# Patient Record
Sex: FEMALE | Race: WHITE
[De-identification: names, ages, dates, MRNs, and addresses within clinical notes are randomized per-mention and may not be internally consistent; named-entity substitution may affect disease eponyms.]

---

## 2018-05-17 ENCOUNTER — HOSPITAL ENCOUNTER (OUTPATIENT)
Age: 23
End: 2018-05-17
Payer: COMMERCIAL

## 2018-05-17 LAB
ADD MANUAL DIFF / SLIDE REVIEW: NO
BUN SERPL-MCNC: 13 MG/DL (ref 7–17)
CALCIUM SERPL-MCNC: 9.2 MG/DL (ref 8.4–10.2)
CHLORIDE SERPL-SCNC: 108 MMOL/L (ref 98–107)
CO2 SERPL-SCNC: 24 MMOL/L (ref 22–32)
ESTIMATED GLOMERULAR FILT RATE: > 60 ML/MIN (ref 60–?)
GLUCOSE SERPL-MCNC: 87 MG/DL (ref 70–100)
HEMATOCRIT: 37 % (ref 36–46)
HEMOGLOBIN: 12.5 G/DL (ref 12–16)
HEMOLYSIS: < 15 (ref 0–50)
MCV RBC: 83.5 FL (ref 80–100)
MEAN CORPUSCULAR HEMOGLOBIN: 28.3 PG (ref 26–34)
MEAN CORPUSCULAR HGB CONC: 33.9 % (ref 30–36)
PLATELET COUNT: 336 X10^3/UL (ref 150–400)
POTASSIUM SERPL-SCNC: 4.5 MMOL/L (ref 3.4–5.1)
SODIUM SERPL-SCNC: 143 MMOL/L (ref 137–145)

## 2018-05-17 PROCEDURE — 36415 COLL VENOUS BLD VENIPUNCTURE: CPT

## 2018-05-17 PROCEDURE — 85025 COMPLETE CBC W/AUTO DIFF WBC: CPT

## 2018-05-17 PROCEDURE — 80048 BASIC METABOLIC PNL TOTAL CA: CPT

## 2018-09-21 ENCOUNTER — HOSPITAL ENCOUNTER (EMERGENCY)
Age: 23
Discharge: ANOTHER HOSPITAL | End: 2018-09-21
Payer: COMMERCIAL

## 2018-09-21 VITALS — SYSTOLIC BLOOD PRESSURE: 156 MMHG | HEART RATE: 79 BPM | DIASTOLIC BLOOD PRESSURE: 78 MMHG

## 2018-09-21 VITALS
OXYGEN SATURATION: 97 % | HEART RATE: 73 BPM | DIASTOLIC BLOOD PRESSURE: 74 MMHG | SYSTOLIC BLOOD PRESSURE: 126 MMHG | RESPIRATION RATE: 18 BRPM

## 2018-09-21 VITALS
TEMPERATURE: 98.2 F | HEART RATE: 73 BPM | RESPIRATION RATE: 20 BRPM | SYSTOLIC BLOOD PRESSURE: 133 MMHG | DIASTOLIC BLOOD PRESSURE: 85 MMHG | OXYGEN SATURATION: 99 %

## 2018-09-21 VITALS — BODY MASS INDEX: 43.2 KG/M2

## 2018-09-21 DIAGNOSIS — I61.9: Primary | ICD-10-CM

## 2018-09-21 LAB
ADD MANUAL DIFF / SLIDE REVIEW: NO
ALBUMIN SERPL-MCNC: 4.3 G/DL (ref 3.5–5)
ALBUMIN/GLOB SERPL: 1.4 {RATIO} (ref 1–2.8)
ALP SERPL-CCNC: 95 U/L (ref 38–126)
ALT SERPL-CCNC: 28 IU/L (ref 9–52)
BUN SERPL-MCNC: 15 MG/DL (ref 7–17)
CALCIUM SERPL-MCNC: 9.4 MG/DL (ref 8.4–10.2)
CHLORIDE SERPL-SCNC: 105 MMOL/L (ref 98–107)
CK SERPL-CCNC: 65 U/L (ref 30–135)
CO2 SERPL-SCNC: 26 MMOL/L (ref 22–32)
ESTIMATED GLOMERULAR FILT RATE: > 60 ML/MIN (ref 60–?)
GLOBULIN SER CALC-MCNC: 3 G/DL (ref 1.7–4.1)
GLUCOSE SERPL-MCNC: 91 MG/DL (ref 70–100)
HEMATOCRIT: 39.9 % (ref 36–46)
HEMOGLOBIN: 13.4 G/DL (ref 12–16)
HEMOLYSIS: < 15 (ref 0–50)
MCV RBC: 81 FL (ref 80–100)
MEAN CORPUSCULAR HEMOGLOBIN: 27.1 PG (ref 26–34)
MEAN CORPUSCULAR HGB CONC: 33.5 % (ref 30–36)
PLATELET COUNT: 295 X10^3/UL (ref 150–400)
POTASSIUM SERPL-SCNC: 4.1 MMOL/L (ref 3.4–5.1)
PROT SERPL-MCNC: 7.3 G/DL (ref 6.3–8.2)
SODIUM SERPL-SCNC: 142 MMOL/L (ref 137–145)
TROPONIN I SERPL-MCNC: < 0.012 NG/ML (ref 0.01–0.03)

## 2018-09-21 PROCEDURE — 80053 COMPREHEN METABOLIC PANEL: CPT

## 2018-09-21 PROCEDURE — 82550 ASSAY OF CK (CPK): CPT

## 2018-09-21 PROCEDURE — 93005 ELECTROCARDIOGRAM TRACING: CPT

## 2018-09-21 PROCEDURE — 70450 CT HEAD/BRAIN W/O DYE: CPT

## 2018-09-21 PROCEDURE — 81025 URINE PREGNANCY TEST: CPT

## 2018-09-21 PROCEDURE — 99283 EMERGENCY DEPT VISIT LOW MDM: CPT

## 2018-09-21 PROCEDURE — 96360 HYDRATION IV INFUSION INIT: CPT

## 2018-09-21 PROCEDURE — 71045 X-RAY EXAM CHEST 1 VIEW: CPT

## 2018-09-21 PROCEDURE — 93041 RHYTHM ECG TRACING: CPT

## 2018-09-21 PROCEDURE — 85025 COMPLETE CBC W/AUTO DIFF WBC: CPT

## 2018-09-21 PROCEDURE — 82553 CREATINE MB FRACTION: CPT

## 2018-09-21 PROCEDURE — 99285 EMERGENCY DEPT VISIT HI MDM: CPT

## 2018-09-21 PROCEDURE — 70496 CT ANGIOGRAPHY HEAD: CPT

## 2018-09-21 PROCEDURE — 81003 URINALYSIS AUTO W/O SCOPE: CPT

## 2018-09-21 PROCEDURE — 96361 HYDRATE IV INFUSION ADD-ON: CPT

## 2018-09-21 PROCEDURE — 84484 ASSAY OF TROPONIN QUANT: CPT

## 2018-09-21 PROCEDURE — 36415 COLL VENOUS BLD VENIPUNCTURE: CPT

## 2018-09-21 NOTE — DI.CT.S_ITS
PROCEDURE:  CT HEAD/BRAIN WO CON  
   
INDICATIONS:  reports headache with periods of increased blood pressure  
   
TECHNIQUE:    
Noncontrast 4.5 mm thick angled axial sections acquired from the foramen magnum to the   
vertex, with coronal and sagittal reformats.  For radiation dose reduction, the following   
was used:  automated exposure control, adjustment of mA and/or kV according to patient   
size.    
   
COMPARISON:  None.  
   
FINDINGS:    
Image quality:  Excellent.    
   
CSF spaces:  Basal cisterns are patent.  No extra-axial fluid collections.  Ventricles   
are normal in size and shape.    
   
Brain:  No midline shift. There is a 0.7 x 1.3 x 0.5 cm hyperdensity in the anterior left   
frontal subcortical white matter consistent with acute/subacute parenchymal hemorrhage.    
Gray-white matter interface is normal.    
   
Skull and face:  Calvarium and visualized facial bones are intact, without suspicious   
lesions.    
   
Sinuses:  Visualized sinuses and mastoids are clear.    
   
IMPRESSION:    
   
1.  Small left frontal acute/subacute parenchymal hematoma.  
   
2.  Findings telephoned to FABIENNE Villeda on 9/21/2018 at 1837 hrs.  
   
   
   
Dictated by: Elizabeth Herrmann MD, PhD on 9/21/2018 at 18:34       
Approved by: Elizabeth Herrmann MD, PhD on 9/21/2018 at 18:40

## 2018-09-21 NOTE — ED_ITS
"HPI - General Adult    
<FABIENNE Villeda - Last Filed: 09/21/18 21:41>    
General    
Chief complaint: Hypertension    
Stated complaint: HIGH BLOOD PRESSURE    
Time Seen by Provider: 09/21/18 17:32    
Source: patient    
Mode of arrival: ambulatory    
Limitations: no limitations    
History of Present Illness    
HPI narrative:   23-year-old healthy female that is a nonsmoker  here for   
complaint of having headache on off over the past several weeks.  She reports   
that she has had worsening headache over the past 3 days.  She also reports   
that her blood pressure has been elevated over the last several days as well.     
She denies any nausea vomiting.  She denies having history of headaches.   She   
does report that she had a history of preeclampsia when she was pregnant the 4   
months ago.  She was treated with labetalol.  she denies taking any blood   
pressure medicines at this time.   Positive p.o. intake.  She denies any chest   
pain no shortness of breath.  No abdominal pain.  She is ambulatory into the   
emergency room.  She denies any neurological changes    
Related Data    
 Home Medications    
    
    
    
 Medication  Instructions  Recorded  Confirmed    
     
sertraline [Zoloft] 100 mg PO QDAY 09/21/18 09/21/18    
    
    
 Previous Rx's    
    
    
    
 Medication  Instructions  Recorded    
     
lorazepam 1 mg tablet 1 mg PO Q8H PRN #30 tab 06/25/18    
    
    
    
 Allergies    
    
    
    
Allergy/AdvReac Type Severity Reaction Status Date / Time    
     
 inhaled anesthesia  AdvReac Intermediate Vomiting Uncoded 05/25/18 16:37    
    
    
    
    
Review of Systems    
<FABIENNE Villeda - Last Filed: 09/21/18 21:41>    
Constitutional    
Denies chills, Denies fever(s), Denies lethargy and Denies weakness    
Eyes    
Denies change in vision, Denies eye discharge, Denies irritation and Denies   
loss of vision    
ENT    
Ears, Nose, Mouth, and Throat: Denies change in voice, Denies neck pain and   
Denies sore throat    
Cardiovascular    
Denies chest pain, Denies irregular heart rhythm, Denies lightheadedness,   
Denies palpitations, Denies dyspnea, Denies dyspnea on exertion and Denies   
orthopnea    
Comments:  increased blood pressure    
Respiratory    
Denies cough, Denies dyspnea, Denies dyspnea on exertion and Denies wheezing    
Musculoskeletal    
Denies neck pain    
Neurologic    
Denies loss of vision and Denies weakness    
Endocrine    
Denies palpitations    
Allergic/Immunologic    
Denies wheezing    
    
Exam    
<FABIENNE Villeda - Last Filed: 09/21/18 21:41>    
Initial Vital Signs    
Initial Vital Signs:  Vital Signs    
    
    
    
Temperature  98.2 F   09/21/18 16:40    
     
Pulse Rate  73   09/21/18 16:40    
     
Respiratory Rate  20   09/21/18 16:40    
     
Blood Pressure  133/85   09/21/18 16:40    
     
Pulse Oximetry  99   09/21/18 16:40    
    
    
    
Const    
General: cooperative and well developed    
Nutritional Appearance: well nourished    
Orientation: alert, awake, oriented x3 and not confused    
[facility] [place]    
Mouth: oral mucosae normal and oropharynx normal    
Eyes    
Conjunctivae: conjunctivae normal    
Sclera: sclerae normal    
Pupils: PERRL    
EOM: EOM intact bilaterally    
Resp    
Effort & Inspection: normal respiratory effort, able to speak in complete   
sentences, no respiratory distress and no use of accessory muscles    
Auscultation: clear to auscultation bilaterally, no rales, no rhonchi and no   
wheezes    
Cardio    
Rate: regular rate    
Rhythm: regular rhythm    
Heart Sounds: no click, no gallops, no murmurs and no rubs    
Pulses: normal peripheral pulses    
GI    
Inspection: non-distended    
Palpation: soft, no hepatosplenomegaly, No guarding, No pulsatile mass and No   
tender    
Auscultation: normal bowel sounds    
    
General: No CVA tenderness    
Skin   
[id number]|2018-09-21 17:43:00|2018-09-21 18:34:00|DI.RAD.S_ITS|[facility]|Imaging|[id number]|"PROCEDURE:  XR CHEST 1V

## 2018-09-21 NOTE — DI.CT.S_ITS
PROCEDURE:  CT ANGIO HEAD  
   
INDICATIONS:  left frontal lobe bleed seen on CT  
   
TECHNIQUE:    
Precontrast 4.5 mm thick angled axial sections acquired from the foramen magnum to the   
vertex.   After the administration of intravenous contrast, 1 mm thick sections acquired   
through the [place].  Postcontrast 4.5 mm thick sections then re-acquired from   
the foramen magnum to the vertex.  10 mm thick maximum-intensity-projection (MIP)   
reformats were acquired of the central intracranial vasculature.  For radiation dose   
reduction, the following was used:  automated exposure control, adjustment of mA and/or   
kV according to patient size.    
   
   
COMPARISON:  [facility], CT, CT HEAD/BRAIN WO CON, 9/21/2018, 18:08.  
   
FINDINGS:    
Image quality:  Excellent.    
   
Anterior circulation:  Intracranial internal carotid arteries are normal in size and   
flow.  The flow within the paired anterior cerebral arteries is normal and symmetric.    
The flow within the middle cerebral arteries is normal and symmetric.  The anterior   
communicating artery is seen.  No aneurysms are seen.    
   
Posterior circulation:  Visualized portions of the vertebral arteries demonstrate normal   
caliber, and join to form a normal appearing basilar artery.  Flow within the posterior   
cerebral arteries is normal and symmetric.  No aneurysms are seen.   
   
Dural sinuses demonstrate normal postcontrast enhancement.   
   
CSF spaces:  Ventricles are normal in size and shape.  Basal cisterns are patent.  No   
extra-axial fluid collections.    
   
Brain:  No midline shift. Small left frontal parenchymal hematomas stable compared to CT   
scan of the head obtained 9/21/2018.  Gray-white matter interface appears intact.    
   
Skull and face:  Calvarium and facial bones appear intact, without suspicious lesions.    
   
Sinuses:  Visualized sinuses and mastoids are clear.    
   
IMPRESSION:  
   
1.  Small left frontal parenchymal hematoma stable compared to 9/21/2018 at 1808 hrs.  
   
2.  No large vessel occlusion, vascular stenosis, dissection, or enhancing arteriovenous   
malformation or enhancing cerebral aneurysm.  
   
   
   
   
Dictated by: Elizabeth Herrmann MD, PhD on 9/21/2018 at 20:08       
Approved by: Elizabeth Herrmann MD, PhD on 9/21/2018 at 20:20

## 2018-09-21 NOTE — ED.GENADULT
"HPI - General Adult
<FABIENNE Villeda - Last Filed: 09/21/18 21:41>
General
Chief complaint: Hypertension
Stated complaint: HIGH BLOOD PRESSURE
Time Seen by Provider: 09/21/18 17:32
Source: patient
Mode of arrival: ambulatory
Limitations: no limitations
History of Present Illness
HPI narrative:   23-year-old healthy female that is a nonsmoker  here for complaint of having headache on off over the past several weeks.  She reports that she has had worsening headache over the past 3 days.  She also reports that her blood 
pressure has been elevated over the last several days as well.   She denies any nausea vomiting.  She denies having history of headaches.   She does report that she had a history of preeclampsia when she was pregnant the 4 months ago.  She was 
treated with labetalol.  she denies taking any blood pressure medicines at this time.   Positive p.o. intake.  She denies any chest pain no shortness of breath.  No abdominal pain.  She is ambulatory into the emergency room.  She denies any 
neurological changes
Related Data
Home Medications

 Medication  Instructions  Recorded  Confirmed
sertraline [Zoloft] 100 mg PO QDAY 09/21/18 09/21/18

Previous Rx's

 Medication  Instructions  Recorded
lorazepam 1 mg tablet 1 mg PO Q8H PRN #30 tab 06/25/18


Allergies

Allergy/AdvReac Type Severity Reaction Status Date / Time
 inhaled anesthesia  AdvReac Intermediate Vomiting Uncoded 05/25/18 16:37



Review of Systems
<FABIENNE Villeda - Last Filed: 09/21/18 21:41>
Constitutional
Denies chills, Denies fever(s), Denies lethargy and Denies weakness
Eyes
Denies change in vision, Denies eye discharge, Denies irritation and Denies loss of vision
ENT
Ears, Nose, Mouth, and Throat: Denies change in voice, Denies neck pain and Denies sore throat
Cardiovascular
Denies chest pain, Denies irregular heart rhythm, Denies lightheadedness, Denies palpitations, Denies dyspnea, Denies dyspnea on exertion and Denies orthopnea
Comments:  increased blood pressure
Respiratory
Denies cough, Denies dyspnea, Denies dyspnea on exertion and Denies wheezing
Musculoskeletal
Denies neck pain
Neurologic
Denies loss of vision and Denies weakness
Endocrine
Denies palpitations
Allergic/Immunologic
Denies wheezing

Exam
<FABIENNE Villeda - Last Filed: 09/21/18 21:41>
Initial Vital Signs
Initial Vital Signs:  Vital Signs

Temperature  98.2 F   09/21/18 16:40
Pulse Rate  73   09/21/18 16:40
Respiratory Rate  20   09/21/18 16:40
Blood Pressure  133/85   09/21/18 16:40
Pulse Oximetry  99   09/21/18 16:40


Const
General: cooperative and well developed
Nutritional Appearance: well nourished
Orientation: alert, awake, oriented x3 and not confused
[facility]
Mouth: oral mucosae normal and oropharynx normal
Eyes
Conjunctivae: conjunctivae normal
Sclera: sclerae normal
Pupils: PERRL
EOM: EOM intact bilaterally
Resp
Effort & Inspection: normal respiratory effort, able to speak in complete sentences, no respiratory distress and no use of accessory muscles
Auscultation: clear to auscultation bilaterally, no rales, no rhonchi and no wheezes
Cardio
Rate: regular rate
Rhythm: regular rhythm
Heart Sounds: no click, no gallops, no murmurs and no rubs
Pulses: normal peripheral pulses
GI
Inspection: non-distended
Palpation: soft, no hepatosplenomegaly, No guarding, No pulsatile mass and No tender
Auscultation: normal bowel sounds

General: No CVA tenderness
Skin
General: no rashes or lesions noted, No jaundice and No petechiae
Neuro
General: alert, oriented x3, gait normal and no focal motor deficits
Speech: speech normal

<Maira Andrew DO - Last Filed: 09/22/18 06:25>
Initial Vital Signs
Initial Vital Signs:  Vital Signs

Temperature  98.2 F   09/21/18 16:40
Pulse Rate  73   09/21/18 16:40
Respiratory Rate  20   09/21/18 16:40
Blood Pressure  133/85   09/21/18 16:40
Pulse Oximetry  99   09/21/18 16:40



Course
<FABIENNE Villeda - Last Filed: 09/21/18 21:41>
Orders
O
[id number]|2018-09-21 17:18:55|2018-09-21 17:18:55|EDBEATRICE||||"HPI - Abdominal Pain

## 2018-10-27 ENCOUNTER — HOSPITAL ENCOUNTER (EMERGENCY)
Dept: HOSPITAL 73 - ED | Age: 23
Discharge: HOME | End: 2018-10-27
Payer: COMMERCIAL

## 2018-10-27 VITALS
DIASTOLIC BLOOD PRESSURE: 81 MMHG | TEMPERATURE: 98.2 F | OXYGEN SATURATION: 98 % | HEART RATE: 87 BPM | RESPIRATION RATE: 16 BRPM | SYSTOLIC BLOOD PRESSURE: 124 MMHG

## 2018-10-27 VITALS — BODY MASS INDEX: 44.6 KG/M2

## 2018-10-27 DIAGNOSIS — J06.9: Primary | ICD-10-CM

## 2018-10-27 PROCEDURE — 99282 EMERGENCY DEPT VISIT SF MDM: CPT

## 2018-10-27 PROCEDURE — 87880 STREP A ASSAY W/OPTIC: CPT

## 2018-10-27 PROCEDURE — 99283 EMERGENCY DEPT VISIT LOW MDM: CPT

## 2018-10-27 RX ADMIN — IBUPROFEN 800 MG: 400 TABLET ORAL at 04:31

## 2018-10-27 NOTE — ED.URI
"HPI - URI/Sore Throat
General
Chief Complaint: Ear
Stated Complaint: Left ear pain
Time Seen by Provider: 10/27/18 04:00
Source: patient and family
Mode of arrival: ambulatory
Limitations: no limitations
History of Present Illness
HPI Narrative: 23-year-old female nonsmoker presents with significant other in the chief complaint of left ear pain and sore throat for the past few hours.  She denies any fever or cough.  Additionally she complains of some nasal congestion and 
clear drainage.  She has had no nausea, vomiting or diarrhea.  She has 2 children at home that have upper respiratory infections currently.  Both with her being treated with antibiotics for ear infection.
MD Complaint: sore throat, rhinorrhea and nasal congestion
Onset (ago): hour(s)
Duration: constant
Severity: mild
Relieving factors: nothing
Exacerbating factors: nothing
Description of mucous: clear and watery
Able to tolerate fluids by mouth: Yes
Context: sick contacts
Associated symptoms: denies other symptoms
Related Data
Home Medications

 Medication  Instructions  Recorded  Confirmed
sertraline [Zoloft] 100 mg PO QDAY 09/21/18 10/19/18
gabapentin 300 mg capsule 300 mg PO Q8H  cap 09/25/18 10/19/18

Previous Rx's

 Medication  Instructions  Recorded
lorazepam 1 mg tablet 1 mg PO Q8H PRN #30 tab 18
ibuprofen 600 mg PO QID PRN #30 tab 10/27/18


Allergies

Allergy/AdvReac Type Severity Reaction Status Date / Time
 inhaled anesthesia  AdvReac Intermediate Vomiting Uncoded 10/19/18 16:04



Review of Systems
Review of Systems
All systems reviewed & are unremarkable except as noted in HPI and below 
Constitutional
Denies chills, Denies fever(s), Denies lethargy and Denies weakness
Eyes
Denies change in vision, Denies eye discharge, Denies irritation and Denies loss of vision
ENT
Ears, Nose, Mouth, and Throat: Denies change in voice, Reports otalgia, Reports nasal congestion, Reports nasal discharge, Denies neck pain and Reports sore throat
Cardiovascular
Denies chest pain, Denies irregular heart rhythm, Denies lightheadedness, Denies palpitations, Denies dyspnea, Denies dyspnea on exertion and Denies orthopnea
Respiratory
Denies cough, Denies dyspnea, Denies dyspnea on exertion and Denies wheezing
Gastrointestinal
Gastrointestinal: Denies abdominal pain, Denies change in bowel habits, Denies diarrhea, Denies nausea and Denies vomiting
Genitourinary
Denies hematuria, Denies flank pain, Denies urinary incontinence and Denies urinary urgency
Musculoskeletal
Denies neck pain
Integumentary/Breasts
Denies pruritus, Denies erythema, Denies rash and Denies wounds
Neurologic
Denies confusion, Denies loss of vision and Denies weakness
Psychiatric
Denies anxiety, Denies confusion, Denies depression, Denies homicidal ideation and Denies suicidal ideation
Endocrine
Denies palpitations
Hematologic/Lymphatic
Denies easy bruising
Allergic/Immunologic
Denies wheezing

PFSH
Medical History (Reviewed 10/27/18 @ 04:11 by Moy Claire DO)

Anxiety and depression (Chronic)
Cerebral cavernous malformation (Chronic)
History of pre-eclampsia (Resolved)


Surgical History (Reviewed 10/27/18 @ 04:11 by Moy Claire DO)

Hx of  section (Inactive)


Social History (Reviewed 10/27/18 @ 04:11 by Moy Claire DO)
marital status:  

number of children:  2

household members:  family

lives independently:  Yes

caregiver/support person:  No

housing:  house

occupational status:  employed

Smoking Status:  Never smoker

second hand exposure:  No

alcohol intake:  current

substance use type:  does not use




Exam
Narrative
Exam Narrative: GEN: AOx3 and in mild distress

EYES: Pupils are equal, round, and reactive to light and accommodation. Extraoccular muscles are intact bilaterally. There is no subconjunctival hemorrhage or exudate.

ENT: clear postnasal drainage. No tender lymphadenopathy. L TM erythematous and mild retraction. Normal landmark
[id number]|2018-10-27 04:15:00|2018-10-27 04:09:00|ED_ITS|[facility]|Emergency Department|[id number]|"HPI - URI/Sore Throat

## 2021-09-10 ENCOUNTER — HOSPITAL ENCOUNTER (OUTPATIENT)
Age: 26
End: 2021-09-10
Payer: COMMERCIAL

## 2021-09-10 DIAGNOSIS — R09.81: ICD-10-CM

## 2021-09-10 DIAGNOSIS — R51.9: ICD-10-CM

## 2021-09-10 DIAGNOSIS — Z20.822: Primary | ICD-10-CM

## 2021-09-10 PROCEDURE — 87635 SARS-COV-2 COVID-19 AMP PRB: CPT

## 2021-11-11 ENCOUNTER — HOSPITAL ENCOUNTER (OUTPATIENT)
Age: 26
End: 2021-11-11
Payer: COMMERCIAL

## 2021-11-11 DIAGNOSIS — Z01.812: ICD-10-CM

## 2021-11-11 DIAGNOSIS — Z20.822: Primary | ICD-10-CM

## 2021-11-11 PROCEDURE — 87635 SARS-COV-2 COVID-19 AMP PRB: CPT

## 2021-11-16 ENCOUNTER — HOSPITAL ENCOUNTER (OUTPATIENT)
Age: 26
End: 2021-11-16
Payer: COMMERCIAL

## 2021-11-16 DIAGNOSIS — R10.9: Primary | ICD-10-CM

## 2021-11-16 PROCEDURE — 76705 ECHO EXAM OF ABDOMEN: CPT

## 2021-12-01 ENCOUNTER — HOSPITAL ENCOUNTER (OUTPATIENT)
Age: 26
End: 2021-12-01
Payer: COMMERCIAL

## 2021-12-01 DIAGNOSIS — Z01.812: Primary | ICD-10-CM

## 2021-12-01 DIAGNOSIS — Z20.822: ICD-10-CM

## 2021-12-01 PROCEDURE — 87635 SARS-COV-2 COVID-19 AMP PRB: CPT

## 2021-12-02 ENCOUNTER — HOSPITAL ENCOUNTER (OUTPATIENT)
Age: 26
Discharge: HOME | End: 2021-12-02
Payer: COMMERCIAL

## 2021-12-02 VITALS
HEART RATE: 71 BPM | DIASTOLIC BLOOD PRESSURE: 74 MMHG | RESPIRATION RATE: 16 BRPM | SYSTOLIC BLOOD PRESSURE: 128 MMHG | OXYGEN SATURATION: 100 %

## 2021-12-02 VITALS
TEMPERATURE: 99.1 F | HEART RATE: 80 BPM | RESPIRATION RATE: 14 BRPM | SYSTOLIC BLOOD PRESSURE: 127 MMHG | DIASTOLIC BLOOD PRESSURE: 89 MMHG | OXYGEN SATURATION: 99 %

## 2021-12-02 VITALS
OXYGEN SATURATION: 100 % | DIASTOLIC BLOOD PRESSURE: 81 MMHG | SYSTOLIC BLOOD PRESSURE: 122 MMHG | RESPIRATION RATE: 16 BRPM | HEART RATE: 75 BPM

## 2021-12-02 VITALS
HEART RATE: 77 BPM | OXYGEN SATURATION: 100 % | DIASTOLIC BLOOD PRESSURE: 79 MMHG | SYSTOLIC BLOOD PRESSURE: 128 MMHG | RESPIRATION RATE: 16 BRPM

## 2021-12-02 VITALS
RESPIRATION RATE: 15 BRPM | SYSTOLIC BLOOD PRESSURE: 138 MMHG | OXYGEN SATURATION: 100 % | HEART RATE: 83 BPM | DIASTOLIC BLOOD PRESSURE: 85 MMHG | TEMPERATURE: 97.34 F

## 2021-12-02 VITALS
RESPIRATION RATE: 16 BRPM | OXYGEN SATURATION: 100 % | SYSTOLIC BLOOD PRESSURE: 133 MMHG | DIASTOLIC BLOOD PRESSURE: 79 MMHG | HEART RATE: 75 BPM

## 2021-12-02 VITALS — BODY MASS INDEX: 45.7 KG/M2

## 2021-12-02 DIAGNOSIS — K62.5: Primary | ICD-10-CM

## 2021-12-02 DIAGNOSIS — R10.13: ICD-10-CM

## 2021-12-02 DIAGNOSIS — K29.50: ICD-10-CM

## 2021-12-02 DIAGNOSIS — K44.9: ICD-10-CM

## 2021-12-02 DIAGNOSIS — K64.8: ICD-10-CM

## 2021-12-02 PROCEDURE — 45378 DIAGNOSTIC COLONOSCOPY: CPT

## 2021-12-02 PROCEDURE — 99153 MOD SED SAME PHYS/QHP EA: CPT

## 2021-12-02 PROCEDURE — 43239 EGD BIOPSY SINGLE/MULTIPLE: CPT

## 2021-12-02 PROCEDURE — 0DJ08ZZ INSPECTION OF UPPER INTESTINAL TRACT, VIA NATURAL OR ARTIFICIAL OPENING ENDOSCOPIC: ICD-10-PCS | Performed by: SURGERY

## 2021-12-02 PROCEDURE — 99152 MOD SED SAME PHYS/QHP 5/>YRS: CPT

## 2021-12-02 PROCEDURE — 0DJD8ZZ INSPECTION OF LOWER INTESTINAL TRACT, VIA NATURAL OR ARTIFICIAL OPENING ENDOSCOPIC: ICD-10-PCS | Performed by: SURGERY

## 2021-12-04 ENCOUNTER — HOSPITAL ENCOUNTER (EMERGENCY)
Age: 26
Discharge: HOME | End: 2021-12-04
Payer: COMMERCIAL

## 2021-12-04 VITALS — OXYGEN SATURATION: 97 % | RESPIRATION RATE: 26 BRPM | HEART RATE: 82 BPM

## 2021-12-04 VITALS
DIASTOLIC BLOOD PRESSURE: 93 MMHG | OXYGEN SATURATION: 99 % | RESPIRATION RATE: 18 BRPM | TEMPERATURE: 98 F | HEART RATE: 84 BPM | SYSTOLIC BLOOD PRESSURE: 138 MMHG

## 2021-12-04 VITALS — OXYGEN SATURATION: 100 % | RESPIRATION RATE: 44 BRPM | HEART RATE: 85 BPM

## 2021-12-04 VITALS — OXYGEN SATURATION: 100 % | RESPIRATION RATE: 16 BRPM | HEART RATE: 73 BPM

## 2021-12-04 VITALS
RESPIRATION RATE: 10 BRPM | OXYGEN SATURATION: 99 % | HEART RATE: 78 BPM | SYSTOLIC BLOOD PRESSURE: 112 MMHG | DIASTOLIC BLOOD PRESSURE: 77 MMHG

## 2021-12-04 VITALS — RESPIRATION RATE: 23 BRPM | HEART RATE: 85 BPM | OXYGEN SATURATION: 98 %

## 2021-12-04 VITALS — BODY MASS INDEX: 45.7 KG/M2

## 2021-12-04 VITALS
HEART RATE: 85 BPM | DIASTOLIC BLOOD PRESSURE: 64 MMHG | SYSTOLIC BLOOD PRESSURE: 115 MMHG | OXYGEN SATURATION: 98 % | RESPIRATION RATE: 32 BRPM

## 2021-12-04 VITALS — OXYGEN SATURATION: 98 % | RESPIRATION RATE: 35 BRPM | HEART RATE: 73 BPM

## 2021-12-04 VITALS — OXYGEN SATURATION: 99 % | HEART RATE: 78 BPM

## 2021-12-04 DIAGNOSIS — K64.8: Primary | ICD-10-CM

## 2021-12-04 DIAGNOSIS — R10.12: ICD-10-CM

## 2021-12-04 LAB
ADD MANUAL DIFF / SLIDE REVIEW: NO
ALBUMIN SERPL-MCNC: 4.2 G/DL (ref 3.5–5)
ALBUMIN/GLOB SERPL: 1.4 {RATIO} (ref 1–2.8)
ALP SERPL-CCNC: 78 U/L (ref 38–126)
ALT SERPL-CCNC: 18 IU/L (ref ?–35)
BUN SERPL-MCNC: 10 MG/DL (ref 7–17)
CALCIUM SERPL-MCNC: 9.5 MG/DL (ref 8.4–10.2)
CHLORIDE SERPL-SCNC: 105 MMOL/L (ref 98–107)
CO2 SERPL-SCNC: 26 MMOL/L (ref 22–32)
ESTIMATED GLOMERULAR FILT RATE: > 60 ML/MIN (ref 60–?)
GLOBULIN SER CALC-MCNC: 3 G/DL (ref 1.7–4.1)
GLUCOSE SERPL-MCNC: 92 MG/DL (ref 70–100)
HEMATOCRIT: 38.9 % (ref 36–46)
HEMOGLOBIN: 12.7 G/DL (ref 12–16)
HEMOLYSIS: < 15 (ref 0–50)
INR PPP: 1 (ref 0.9–1.3)
LACTATE SERPL-MCNC: 1.1 MMOL/L (ref 0.7–2.1)
LIPASE SERPL-CCNC: 43 U/L (ref 23–300)
LYMPHOCYTES # SPEC AUTO: 2600 /UL (ref 1100–4500)
MCV RBC: 78.4 FL (ref 80–100)
MEAN CORPUSCULAR HEMOGLOBIN: 25.5 PG (ref 26–34)
MEAN CORPUSCULAR HGB CONC: 32.6 % (ref 30–36)
PLATELET COUNT: 308 X10^3/UL (ref 150–400)
POTASSIUM SERPL-SCNC: 4 MMOL/L (ref 3.4–5.1)
PROT SERPL-MCNC: 7.2 G/DL (ref 6.3–8.2)
PROTHROMBIN TIME: 11.7 SECONDS (ref 10.1–12.7)
PTT PARTIAL THROMBOPLASTIN TIM: 38 SECONDS (ref 26.4–36.2)
SODIUM SERPL-SCNC: 141 MMOL/L (ref 137–145)

## 2021-12-04 PROCEDURE — 83605 ASSAY OF LACTIC ACID: CPT

## 2021-12-04 PROCEDURE — 81025 URINE PREGNANCY TEST: CPT

## 2021-12-04 PROCEDURE — 74177 CT ABD & PELVIS W/CONTRAST: CPT

## 2021-12-04 PROCEDURE — 36415 COLL VENOUS BLD VENIPUNCTURE: CPT

## 2021-12-04 PROCEDURE — 81003 URINALYSIS AUTO W/O SCOPE: CPT

## 2021-12-04 PROCEDURE — C9113 INJ PANTOPRAZOLE SODIUM, VIA: HCPCS

## 2021-12-04 PROCEDURE — 85610 PROTHROMBIN TIME: CPT

## 2021-12-04 PROCEDURE — 80053 COMPREHEN METABOLIC PANEL: CPT

## 2021-12-04 PROCEDURE — 81015 MICROSCOPIC EXAM OF URINE: CPT

## 2021-12-04 PROCEDURE — 96374 THER/PROPH/DIAG INJ IV PUSH: CPT

## 2021-12-04 PROCEDURE — 85025 COMPLETE CBC W/AUTO DIFF WBC: CPT

## 2021-12-04 PROCEDURE — 85730 THROMBOPLASTIN TIME PARTIAL: CPT

## 2021-12-04 PROCEDURE — 99284 EMERGENCY DEPT VISIT MOD MDM: CPT

## 2021-12-04 PROCEDURE — 83690 ASSAY OF LIPASE: CPT

## 2022-11-21 ENCOUNTER — HOSPITAL ENCOUNTER (EMERGENCY)
Age: 27
Discharge: HOME | End: 2022-11-21
Payer: COMMERCIAL

## 2022-11-21 VITALS
RESPIRATION RATE: 16 BRPM | SYSTOLIC BLOOD PRESSURE: 163 MMHG | OXYGEN SATURATION: 99 % | DIASTOLIC BLOOD PRESSURE: 93 MMHG | HEART RATE: 77 BPM | TEMPERATURE: 97.3 F

## 2022-11-21 VITALS — RESPIRATION RATE: 30 BRPM | HEART RATE: 73 BPM | OXYGEN SATURATION: 99 %

## 2022-11-21 VITALS
DIASTOLIC BLOOD PRESSURE: 83 MMHG | SYSTOLIC BLOOD PRESSURE: 145 MMHG | HEART RATE: 75 BPM | RESPIRATION RATE: 18 BRPM | OXYGEN SATURATION: 99 %

## 2022-11-21 VITALS
HEART RATE: 78 BPM | OXYGEN SATURATION: 98 % | RESPIRATION RATE: 22 BRPM | SYSTOLIC BLOOD PRESSURE: 99 MMHG | DIASTOLIC BLOOD PRESSURE: 55 MMHG

## 2022-11-21 VITALS
OXYGEN SATURATION: 100 % | RESPIRATION RATE: 23 BRPM | HEART RATE: 78 BPM | DIASTOLIC BLOOD PRESSURE: 69 MMHG | SYSTOLIC BLOOD PRESSURE: 114 MMHG

## 2022-11-21 VITALS — OXYGEN SATURATION: 100 % | RESPIRATION RATE: 27 BRPM | HEART RATE: 73 BPM

## 2022-11-21 VITALS — BODY MASS INDEX: 45.7 KG/M2

## 2022-11-21 DIAGNOSIS — R42: ICD-10-CM

## 2022-11-21 DIAGNOSIS — R11.0: ICD-10-CM

## 2022-11-21 DIAGNOSIS — R51.9: Primary | ICD-10-CM

## 2022-11-21 DIAGNOSIS — Z20.822: ICD-10-CM

## 2022-11-21 LAB
ADD MANUAL DIFF / SLIDE REVIEW: NO
ALBUMIN SERPL-MCNC: 4.2 G/DL (ref 3.5–5)
ALBUMIN/GLOB SERPL: 1.3 {RATIO} (ref 1–2.8)
ALP SERPL-CCNC: 104 U/L (ref 38–126)
ALT SERPL-CCNC: 18 IU/L (ref ?–35)
BUN SERPL-MCNC: 9 MG/DL (ref 7–17)
CALCIUM SERPL-MCNC: 9.1 MG/DL (ref 8.4–10.2)
CHLORIDE SERPL-SCNC: 105 MMOL/L (ref 98–107)
CO2 SERPL-SCNC: 27 MMOL/L (ref 22–32)
ESTIMATED GLOMERULAR FILT RATE: > 60 ML/MIN (ref 60–?)
FLUAV RNA UPPER RESP QL NAA+PROBE: (no result)
FLUBV RNA UPPER RESP QL NAA+PROBE: (no result)
GLOBULIN SER CALC-MCNC: 3.3 G/DL (ref 1.7–4.1)
GLUCOSE SERPL-MCNC: 91 MG/DL (ref 70–100)
HEMATOCRIT: 37.7 % (ref 36–46)
HEMOGLOBIN: 12.3 G/DL (ref 12–16)
HEMOLYSIS: < 15 (ref 0–50)
LYMPHOCYTES # SPEC AUTO: 3300 /UL (ref 1100–4500)
MCV RBC: 75.8 FL (ref 80–100)
MEAN CORPUSCULAR HEMOGLOBIN: 24.7 PG (ref 26–34)
MEAN CORPUSCULAR HGB CONC: 32.6 % (ref 30–36)
PLATELET COUNT: 365 X10^3/UL (ref 150–400)
POTASSIUM SERPL-SCNC: 3.8 MMOL/L (ref 3.4–5.1)
PROT SERPL-MCNC: 7.5 G/DL (ref 6.3–8.2)
SODIUM SERPL-SCNC: 141 MMOL/L (ref 137–145)

## 2022-11-21 PROCEDURE — 70450 CT HEAD/BRAIN W/O DYE: CPT

## 2022-11-21 PROCEDURE — 36415 COLL VENOUS BLD VENIPUNCTURE: CPT

## 2022-11-21 PROCEDURE — 85025 COMPLETE CBC W/AUTO DIFF WBC: CPT

## 2022-11-21 PROCEDURE — 86140 C-REACTIVE PROTEIN: CPT

## 2022-11-21 PROCEDURE — 99284 EMERGENCY DEPT VISIT MOD MDM: CPT

## 2022-11-21 PROCEDURE — 96360 HYDRATION IV INFUSION INIT: CPT

## 2022-11-21 PROCEDURE — 0241U: CPT

## 2022-11-21 PROCEDURE — 85651 RBC SED RATE NONAUTOMATED: CPT

## 2022-11-21 PROCEDURE — 80053 COMPREHEN METABOLIC PANEL: CPT

## 2022-11-29 ENCOUNTER — HOSPITAL ENCOUNTER (OUTPATIENT)
Age: 27
End: 2022-11-29
Payer: COMMERCIAL

## 2022-11-29 DIAGNOSIS — R42: ICD-10-CM

## 2022-11-29 DIAGNOSIS — R53.83: ICD-10-CM

## 2022-11-29 DIAGNOSIS — H53.9: Primary | ICD-10-CM

## 2022-11-29 LAB
FERRITIN SERPL-MCNC: 17 NG/ML (ref 6–137)
HEMOLYSIS: < 15 (ref 0–50)
IRON SERPL-MCNC: 40 UG/DL (ref 37–170)
PERCENT IRON SATURATION: 11 % (ref 15–50)
TIBC SERPL-MCNC: 373 UG/DL (ref 265–497)
TRANSFERRIN SERPL-MCNC: 288 MG/DL (ref 206–381)
TSH W/ REFLEX TO FT4: 1.39 UIU/ML (ref 0.47–4.68)
VIT B12 SERPL-MCNC: 405 PG/ML (ref 239–931)

## 2022-11-29 PROCEDURE — 83540 ASSAY OF IRON: CPT

## 2022-11-29 PROCEDURE — 36415 COLL VENOUS BLD VENIPUNCTURE: CPT

## 2022-11-29 PROCEDURE — 82607 VITAMIN B-12: CPT

## 2022-11-29 PROCEDURE — 83550 IRON BINDING TEST: CPT

## 2022-11-29 PROCEDURE — 84443 ASSAY THYROID STIM HORMONE: CPT

## 2022-11-29 PROCEDURE — 82728 ASSAY OF FERRITIN: CPT

## 2023-09-11 ENCOUNTER — HOSPITAL ENCOUNTER (OUTPATIENT)
Age: 28
End: 2023-09-11
Payer: COMMERCIAL

## 2023-09-11 DIAGNOSIS — R10.2: Primary | ICD-10-CM

## 2023-09-11 DIAGNOSIS — R10.9: ICD-10-CM

## 2023-09-11 DIAGNOSIS — K76.0: ICD-10-CM

## 2023-09-11 DIAGNOSIS — Z98.891: ICD-10-CM

## 2023-09-11 PROCEDURE — 74176 CT ABD & PELVIS W/O CONTRAST: CPT

## 2024-10-11 ENCOUNTER — HOSPITAL ENCOUNTER (OUTPATIENT)
Age: 29
End: 2024-10-11
Payer: COMMERCIAL

## 2024-10-11 DIAGNOSIS — E66.9: Primary | ICD-10-CM

## 2024-10-11 LAB
ALBUMIN SERPL-MCNC: 3.8 G/DL (ref 3.5–5)
ALBUMIN/GLOB SERPL: 1.4 {RATIO} (ref 1–2.8)
ALP SERPL-CCNC: 102 U/L (ref 38–126)
ALT SERPL-CCNC: 17 IU/L (ref ?–35)
BUN SERPL-MCNC: 9 MG/DL (ref 7–17)
CALCIUM SERPL-MCNC: 9.3 MG/DL (ref 8.4–10.2)
CHLORIDE SERPL-SCNC: 107 MMOL/L (ref 98–107)
CHOLEST SERPL-MCNC: 153 MG/DL (ref 140–199)
CO2 SERPL-SCNC: 25 MMOL/L (ref 22–32)
ESTIMATED GLOMERULAR FILT RATE: > 60 ML/MIN (ref 60–?)
GLOBULIN SER CALC-MCNC: 2.7 G/DL (ref 1.7–4.1)
GLUCOSE SERPL-MCNC: 97 MG/DL (ref 70–100)
HBA1C MFR BLD: 5.8 % (ref 4–6)
HDLC SERPL-MCNC: 33 MG/DL (ref 40–60)
HEMOLYSIS: < 15 (ref 0–50)
POTASSIUM SERPL-SCNC: 4.4 MMOL/L (ref 3.4–5.1)
PROT SERPL-MCNC: 6.5 G/DL (ref 6.3–8.2)
SODIUM SERPL-SCNC: 138 MMOL/L (ref 137–145)
TRIGL SERPL-MCNC: 105 MG/DL (ref 35–150)

## 2024-10-11 PROCEDURE — 36415 COLL VENOUS BLD VENIPUNCTURE: CPT

## 2024-10-11 PROCEDURE — 80053 COMPREHEN METABOLIC PANEL: CPT

## 2024-10-11 PROCEDURE — 83036 HEMOGLOBIN GLYCOSYLATED A1C: CPT

## 2024-10-11 PROCEDURE — 80061 LIPID PANEL: CPT
